# Patient Record
Sex: MALE | Race: WHITE | NOT HISPANIC OR LATINO | URBAN - METROPOLITAN AREA
[De-identification: names, ages, dates, MRNs, and addresses within clinical notes are randomized per-mention and may not be internally consistent; named-entity substitution may affect disease eponyms.]

---

## 2018-06-22 ENCOUNTER — EMERGENCY (EMERGENCY)
Facility: HOSPITAL | Age: 63
LOS: 1 days | Discharge: HOME | End: 2018-06-22
Admitting: EMERGENCY MEDICINE
Payer: MEDICAID

## 2018-06-22 ENCOUNTER — EMERGENCY (EMERGENCY)
Facility: HOSPITAL | Age: 63
LOS: 1 days | Discharge: LEFT BEFORE DISCHARGE | End: 2018-06-22
Admitting: EMERGENCY MEDICINE

## 2018-06-22 VITALS
RESPIRATION RATE: 17 BRPM | DIASTOLIC BLOOD PRESSURE: 99 MMHG | OXYGEN SATURATION: 98 % | TEMPERATURE: 98 F | HEART RATE: 95 BPM | SYSTOLIC BLOOD PRESSURE: 158 MMHG

## 2018-06-22 VITALS
TEMPERATURE: 98 F | SYSTOLIC BLOOD PRESSURE: 138 MMHG | HEART RATE: 94 BPM | RESPIRATION RATE: 16 BRPM | DIASTOLIC BLOOD PRESSURE: 89 MMHG | OXYGEN SATURATION: 98 %

## 2018-06-22 DIAGNOSIS — T74.11XA ADULT PHYSICAL ABUSE, CONFIRMED, INITIAL ENCOUNTER: ICD-10-CM
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
## 2018-06-22 DIAGNOSIS — R51 HEADACHE: ICD-10-CM
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
## 2018-06-22 DIAGNOSIS — Z04.1 ENCOUNTER FOR EXAMINATION AND OBSERVATION FOLLOWING TRANSPORT ACCIDENT: ICD-10-CM

## 2018-06-22 DIAGNOSIS — M25.511 PAIN IN RIGHT SHOULDER: ICD-10-CM
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
## 2018-06-22 PROCEDURE — 73030 X-RAY EXAM OF SHOULDER: CPT | Mod: 26,RT

## 2018-06-22 PROCEDURE — 99284 EMERGENCY DEPT VISIT MOD MDM: CPT | Mod: 25

## 2018-06-22 PROCEDURE — 70450 CT HEAD/BRAIN W/O DYE: CPT | Mod: 26

## 2018-06-22 RX ORDER — ACETAMINOPHEN 500 MG
975 TABLET ORAL ONCE
Qty: 0 | Refills: 0 | Status: COMPLETED | OUTPATIENT
Start: 2018-06-22 | End: 2018-06-22

## 2018-06-22 RX ADMIN — Medication 975 MILLIGRAM(S): at 23:43
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
## 2018-06-22 NOTE — ED PROVIDER NOTE - MEDICAL DECISION MAKING DETAILS
CT Head and right shoulder xray negative. Pt given tylenol in ED. A&Ox3. NAD. Ambulating well and without difficulty. Supportive care instructions, F/U with PMD this week. Strict return precautions reviewed with pt in which pt verbalizes understanding and agrees to.

## 2022-04-19 PROBLEM — Z00.00 ENCOUNTER FOR PREVENTIVE HEALTH EXAMINATION: Status: ACTIVE | Noted: 2022-04-19

## 2022-04-22 ENCOUNTER — APPOINTMENT (OUTPATIENT)
Dept: UROLOGY | Facility: CLINIC | Age: 67
End: 2022-04-22
Payer: MEDICAID

## 2022-04-22 VITALS
BODY MASS INDEX: 27.28 KG/M2 | RESPIRATION RATE: 17 BRPM | SYSTOLIC BLOOD PRESSURE: 149 MMHG | HEIGHT: 68 IN | DIASTOLIC BLOOD PRESSURE: 80 MMHG | HEART RATE: 72 BPM | WEIGHT: 180 LBS | TEMPERATURE: 97.3 F

## 2022-04-22 DIAGNOSIS — R39.9 UNSPECIFIED SYMPTOMS AND SIGNS INVOLVING THE GENITOURINARY SYSTEM: ICD-10-CM

## 2022-04-22 DIAGNOSIS — Z12.5 ENCOUNTER FOR SCREENING FOR MALIGNANT NEOPLASM OF PROSTATE: ICD-10-CM

## 2022-04-22 PROCEDURE — 99204 OFFICE O/P NEW MOD 45 MIN: CPT

## 2022-04-22 NOTE — REVIEW OF SYSTEMS
[Eyesight Problems] : eyesight problems [Dry Eyes] : dryness of the eyes [see HPI] : see HPI [Seen by urologist before (Name)  ___] : Preciously seen by a urologist: [unfilled] [Wake up at night to urinate  How many times?  ___] : wakes up to urinate [unfilled] times during the night [Strain or push to urinate] : strain or push to urinate [Wait a long time to urinate] : waits a long time to urinate [Slow urine stream] : slow urine stream [Interrupted urine stream] : interrupted urine stream [Bladder fullness after urinating] : bladder fullness after urinating [Negative] : Heme/Lymph

## 2022-04-22 NOTE — ASSESSMENT
[FreeTextEntry1] : 66 year old with OAB. PVR minimal. UCx sent. Given failed 2 outlet procedures and anticholinergic medications, will trial mirabegron. If no impact will perform UDS.\par \par PSA today. Will repeat MRI given PIRADS 4 lesion on MRI and biopsy was not fusion/targeted.\par \par    I spent 45 -minutes time today on all issues related to this encounter on today date of service including non face to face time.\par

## 2022-04-22 NOTE — PHYSICAL EXAM
[General Appearance - Well Developed] : well developed [General Appearance - Well Nourished] : well nourished [Normal Appearance] : normal appearance [Well Groomed] : well groomed [General Appearance - In No Acute Distress] : no acute distress [Edema] : no peripheral edema [] : no respiratory distress [Respiration, Rhythm And Depth] : normal respiratory rhythm and effort [Exaggerated Use Of Accessory Muscles For Inspiration] : no accessory muscle use [No Prostate Nodules] : no prostate nodules [FreeTextEntry1] : no SARIAH

## 2022-04-22 NOTE — HISTORY OF PRESENT ILLNESS
[FreeTextEntry1] : 66 year old with BPH.\par \par Patient had REZUM ~2018. Helped initially, but then experienced nocturia, frequency, intermittent stream, straining, feeling of incomplete bladder emptying.\par \par Cystoscopy 5/21: moderately enlarged prostate with bilobar hyperplasia. \par \par PSA 3 and MRI at R 6/2021: 44cc pirads 4 L mid PZpl and PIRADS 3 L mid-apex PZ\par \par TR prostate bx 6/9/21, negative, however, based on outside records does not seem to have been fusion biopsy.\par \par TURP 6/29/21, chips benign. Did not improve symptoms. Patient also reports that he feels like constant sweating/wetness since the surgery but did not have this prior. Tried Ditropan and Detrol in the past, but does not believe they has an impact. Does not believe he has had UDS in the past.\par \par Currently experiencing nocturia 4-5x, frequency, intermittent stream, straining, feeling of incomplete bladder emptying. No hematuria, nephrolithiasis. Father passed of bladder cancer at 74. Not on any medication. Patient does report spine issues.\par \par IPSS 26, unhappy\par \par PVR today 0\par \par Patient has taken Cialis in the past\par \par \par PSA \par 5/11/21: 3.0 \par 5/2019: 2.1\par \par T 386 5/2019

## 2022-04-24 LAB — PSA SERPL-MCNC: 3.07 NG/ML

## 2022-04-26 RX ORDER — MIRABEGRON 25 MG/1
25 TABLET, FILM COATED, EXTENDED RELEASE ORAL
Qty: 90 | Refills: 3 | Status: DISCONTINUED | COMMUNITY
Start: 2022-04-22 | End: 2022-04-26

## 2022-05-05 ENCOUNTER — APPOINTMENT (OUTPATIENT)
Dept: MRI IMAGING | Facility: CLINIC | Age: 67
End: 2022-05-05
Payer: MEDICAID

## 2022-05-05 ENCOUNTER — OUTPATIENT (OUTPATIENT)
Dept: OUTPATIENT SERVICES | Facility: HOSPITAL | Age: 67
LOS: 1 days | End: 2022-05-05

## 2022-05-05 ENCOUNTER — RESULT REVIEW (OUTPATIENT)
Age: 67
End: 2022-05-05

## 2022-05-05 DIAGNOSIS — R97.20 ELEVATED PROSTATE SPECIFIC ANTIGEN [PSA]: ICD-10-CM

## 2022-05-05 PROCEDURE — 72197 MRI PELVIS W/O & W/DYE: CPT | Mod: 26

## 2022-05-06 ENCOUNTER — NON-APPOINTMENT (OUTPATIENT)
Age: 67
End: 2022-05-06

## 2022-05-06 NOTE — HISTORY OF PRESENT ILLNESS
[FreeTextEntry1] : Discussed MRI results - PIRADS 3 and 4 lesion. patient's previous biopsy was not targeted. Discussed need for repeat biopsy TP with fusion. Reached out to Dr. Brown's team to schedule. They will contact patient. Patient in agreement with plan.

## 2022-05-10 ENCOUNTER — NON-APPOINTMENT (OUTPATIENT)
Age: 67
End: 2022-05-10

## 2022-05-10 DIAGNOSIS — Z01.818 ENCOUNTER FOR OTHER PREPROCEDURAL EXAMINATION: ICD-10-CM

## 2022-05-18 ENCOUNTER — APPOINTMENT (OUTPATIENT)
Dept: UROLOGY | Facility: CLINIC | Age: 67
End: 2022-05-18
Payer: MEDICAID

## 2022-05-18 ENCOUNTER — OUTPATIENT (OUTPATIENT)
Dept: OUTPATIENT SERVICES | Facility: HOSPITAL | Age: 67
LOS: 1 days | End: 2022-05-18
Payer: SELF-PAY

## 2022-05-18 VITALS
RESPIRATION RATE: 17 BRPM | BODY MASS INDEX: 27.28 KG/M2 | HEART RATE: 80 BPM | OXYGEN SATURATION: 99 % | SYSTOLIC BLOOD PRESSURE: 138 MMHG | DIASTOLIC BLOOD PRESSURE: 82 MMHG | HEIGHT: 68 IN | TEMPERATURE: 97.6 F | WEIGHT: 180 LBS

## 2022-05-18 DIAGNOSIS — R97.20 ELEVATED PROSTATE, SPECIFIC ANTIGEN [PSA]: ICD-10-CM

## 2022-05-18 DIAGNOSIS — R35.0 FREQUENCY OF MICTURITION: ICD-10-CM

## 2022-05-18 PROCEDURE — 76872 US TRANSRECTAL: CPT | Mod: 26

## 2022-05-18 PROCEDURE — 76942 ECHO GUIDE FOR BIOPSY: CPT | Mod: 26,59

## 2022-05-18 PROCEDURE — 55700: CPT

## 2022-05-18 PROCEDURE — 76942 ECHO GUIDE FOR BIOPSY: CPT | Mod: 59

## 2022-05-18 PROCEDURE — 76872 US TRANSRECTAL: CPT

## 2022-05-20 ENCOUNTER — TRANSCRIPTION ENCOUNTER (OUTPATIENT)
Age: 67
End: 2022-05-20

## 2022-05-23 LAB — CORE LAB BIOPSY: NORMAL

## 2022-05-25 DIAGNOSIS — R97.20 ELEVATED PROSTATE SPECIFIC ANTIGEN [PSA]: ICD-10-CM

## 2022-05-27 RX ORDER — DIAZEPAM 5 MG/1
5 TABLET ORAL
Qty: 1 | Refills: 0 | Status: DISCONTINUED | COMMUNITY
Start: 2022-05-10 | End: 2022-05-27

## 2022-05-27 RX ORDER — HYDROCODONE BITARTRATE AND ACETAMINOPHEN 5; 325 MG/1; MG/1
5-325 TABLET ORAL
Qty: 1 | Refills: 0 | Status: DISCONTINUED | COMMUNITY
Start: 2022-05-10 | End: 2022-05-27

## 2022-06-06 ENCOUNTER — APPOINTMENT (OUTPATIENT)
Dept: UROLOGY | Facility: CLINIC | Age: 67
End: 2022-06-06
Payer: MEDICARE

## 2022-06-06 VITALS
SYSTOLIC BLOOD PRESSURE: 150 MMHG | RESPIRATION RATE: 17 BRPM | HEIGHT: 68 IN | WEIGHT: 175 LBS | BODY MASS INDEX: 26.52 KG/M2 | HEART RATE: 93 BPM | DIASTOLIC BLOOD PRESSURE: 92 MMHG

## 2022-06-06 PROCEDURE — 99215 OFFICE O/P EST HI 40 MIN: CPT

## 2022-06-09 ENCOUNTER — OUTPATIENT (OUTPATIENT)
Dept: OUTPATIENT SERVICES | Facility: HOSPITAL | Age: 67
LOS: 1 days | End: 2022-06-09
Payer: MEDICARE

## 2022-06-09 VITALS
WEIGHT: 179.02 LBS | HEART RATE: 73 BPM | DIASTOLIC BLOOD PRESSURE: 85 MMHG | TEMPERATURE: 97 F | HEIGHT: 68 IN | RESPIRATION RATE: 16 BRPM | OXYGEN SATURATION: 99 % | SYSTOLIC BLOOD PRESSURE: 127 MMHG

## 2022-06-09 DIAGNOSIS — C61 MALIGNANT NEOPLASM OF PROSTATE: ICD-10-CM

## 2022-06-09 DIAGNOSIS — I10 ESSENTIAL (PRIMARY) HYPERTENSION: ICD-10-CM

## 2022-06-09 DIAGNOSIS — Z90.79 ACQUIRED ABSENCE OF OTHER GENITAL ORGAN(S): Chronic | ICD-10-CM

## 2022-06-09 DIAGNOSIS — Z98.890 OTHER SPECIFIED POSTPROCEDURAL STATES: Chronic | ICD-10-CM

## 2022-06-09 LAB
ANION GAP SERPL CALC-SCNC: 12 MMOL/L — SIGNIFICANT CHANGE UP (ref 7–14)
BUN SERPL-MCNC: 13 MG/DL — SIGNIFICANT CHANGE UP (ref 7–23)
CALCIUM SERPL-MCNC: 9.9 MG/DL — SIGNIFICANT CHANGE UP (ref 8.4–10.5)
CHLORIDE SERPL-SCNC: 103 MMOL/L — SIGNIFICANT CHANGE UP (ref 98–107)
CO2 SERPL-SCNC: 24 MMOL/L — SIGNIFICANT CHANGE UP (ref 22–31)
CREAT SERPL-MCNC: 0.91 MG/DL — SIGNIFICANT CHANGE UP (ref 0.5–1.3)
EGFR: 94 ML/MIN/1.73M2 — SIGNIFICANT CHANGE UP
GLUCOSE SERPL-MCNC: 92 MG/DL — SIGNIFICANT CHANGE UP (ref 70–99)
HCT VFR BLD CALC: 41.7 % — SIGNIFICANT CHANGE UP (ref 39–50)
HGB BLD-MCNC: 13.3 G/DL — SIGNIFICANT CHANGE UP (ref 13–17)
MCHC RBC-ENTMCNC: 23.1 PG — LOW (ref 27–34)
MCHC RBC-ENTMCNC: 31.9 GM/DL — LOW (ref 32–36)
MCV RBC AUTO: 72.5 FL — LOW (ref 80–100)
NRBC # BLD: 0 /100 WBCS — SIGNIFICANT CHANGE UP
NRBC # FLD: 0 K/UL — SIGNIFICANT CHANGE UP
PLATELET # BLD AUTO: 396 K/UL — SIGNIFICANT CHANGE UP (ref 150–400)
POTASSIUM SERPL-MCNC: 3.9 MMOL/L — SIGNIFICANT CHANGE UP (ref 3.5–5.3)
POTASSIUM SERPL-SCNC: 3.9 MMOL/L — SIGNIFICANT CHANGE UP (ref 3.5–5.3)
RBC # BLD: 5.75 M/UL — SIGNIFICANT CHANGE UP (ref 4.2–5.8)
RBC # FLD: 16.3 % — HIGH (ref 10.3–14.5)
SODIUM SERPL-SCNC: 139 MMOL/L — SIGNIFICANT CHANGE UP (ref 135–145)
WBC # BLD: 6.63 K/UL — SIGNIFICANT CHANGE UP (ref 3.8–10.5)
WBC # FLD AUTO: 6.63 K/UL — SIGNIFICANT CHANGE UP (ref 3.8–10.5)

## 2022-06-09 PROCEDURE — 93010 ELECTROCARDIOGRAM REPORT: CPT

## 2022-06-09 RX ORDER — VALSARTAN 80 MG/1
1 TABLET ORAL
Qty: 0 | Refills: 0 | DISCHARGE

## 2022-06-09 NOTE — H&P PST ADULT - NSICDXPASTSURGICALHX_GEN_ALL_CORE_FT
PAST SURGICAL HISTORY:  History of prostate surgery 2018 "RESUME procedure"    S/P TURP (transurethral resection of prostate) 2021

## 2022-06-09 NOTE — H&P PST ADULT - HISTORY OF PRESENT ILLNESS
67 yo male with preop dx. malignant neoplasm of prostate presents to pre surgical testing.  Pt with elevated PSA s/p prostate biopsy 5/2022.  Pt is scheduled for transperineal fusion guided focal cryoablation of the prostate.

## 2022-06-09 NOTE — H&P PST ADULT - PROBLEM SELECTOR PLAN 1
Pt is scheduled for transperineal fusion guided focal cryoablation of the prostate on 6/17/22.  Verbal and written pre op instructions reviewed with patient and pt able to verbalize understanding.  Pt instructed to follow surgeon's guidelines regarding COVID testing preop.

## 2022-06-09 NOTE — H&P PST ADULT - NEGATIVE RESPIRATORY AND THORAX SYMPTOMS
PATIENT ONLY HAS ONE PILL LEFT  WAITING FOR MEDS TO COME IN THE MAIL  WILL SEND SOME TO LOCAL PHARM 
Refill being sent on a different encounter since it was not done over the weekend and patient called again this morning 
no wheezing/no dyspnea/no cough

## 2022-06-09 NOTE — H&P PST ADULT - NSICDXPASTMEDICALHX_GEN_ALL_CORE_FT
PAST MEDICAL HISTORY:  Anxiety     GERD (gastroesophageal reflux disease)     HLD (hyperlipidemia)     HTN (hypertension)     Malignant neoplasm of prostate

## 2022-06-09 NOTE — H&P PST ADULT - NSICDXFAMILYHX_GEN_ALL_CORE_FT
FAMILY HISTORY:  Father  Still living? Unknown  FH: cancer, Age at diagnosis: Age Unknown    Mother  Still living? No  FH: cancer, Age at diagnosis: Age Unknown

## 2022-06-09 NOTE — H&P PST ADULT - SKIN/BREAST
[Menarche Age ____] : age at menarche was [unfilled] [Menopause Age____] : age at menopause was [unfilled] [Total Preg ___] : G[unfilled] [FreeTextEntry5] : JEANNETTE [FreeTextEntry7] : YES   details…

## 2022-06-15 LAB — SARS-COV-2 RNA SPEC QL NAA+PROBE: SIGNIFICANT CHANGE UP

## 2022-06-16 ENCOUNTER — TRANSCRIPTION ENCOUNTER (OUTPATIENT)
Age: 67
End: 2022-06-16

## 2022-06-16 VITALS
TEMPERATURE: 98 F | RESPIRATION RATE: 16 BRPM | OXYGEN SATURATION: 99 % | SYSTOLIC BLOOD PRESSURE: 117 MMHG | HEART RATE: 73 BPM | HEIGHT: 68 IN | WEIGHT: 179.02 LBS | DIASTOLIC BLOOD PRESSURE: 79 MMHG

## 2022-06-17 ENCOUNTER — APPOINTMENT (OUTPATIENT)
Dept: UROLOGY | Facility: AMBULATORY SURGERY CENTER | Age: 67
End: 2022-06-17

## 2022-06-17 ENCOUNTER — TRANSCRIPTION ENCOUNTER (OUTPATIENT)
Age: 67
End: 2022-06-17

## 2022-06-17 ENCOUNTER — OUTPATIENT (OUTPATIENT)
Dept: OUTPATIENT SERVICES | Facility: HOSPITAL | Age: 67
LOS: 1 days | Discharge: HOME | End: 2022-06-17
Payer: MEDICARE

## 2022-06-17 ENCOUNTER — NON-APPOINTMENT (OUTPATIENT)
Age: 67
End: 2022-06-17

## 2022-06-17 VITALS
RESPIRATION RATE: 23 BRPM | HEART RATE: 82 BPM | DIASTOLIC BLOOD PRESSURE: 69 MMHG | TEMPERATURE: 98 F | OXYGEN SATURATION: 99 % | SYSTOLIC BLOOD PRESSURE: 107 MMHG

## 2022-06-17 DIAGNOSIS — Z90.79 ACQUIRED ABSENCE OF OTHER GENITAL ORGAN(S): Chronic | ICD-10-CM

## 2022-06-17 DIAGNOSIS — C61 MALIGNANT NEOPLASM OF PROSTATE: ICD-10-CM

## 2022-06-17 DIAGNOSIS — Z98.890 OTHER SPECIFIED POSTPROCEDURAL STATES: Chronic | ICD-10-CM

## 2022-06-17 PROCEDURE — 76942 ECHO GUIDE FOR BIOPSY: CPT | Mod: 26,59

## 2022-06-17 PROCEDURE — 76377 3D RENDER W/INTRP POSTPROCES: CPT | Mod: 26

## 2022-06-17 PROCEDURE — 55873 CRYOABLATE PROSTATE: CPT

## 2022-06-17 PROCEDURE — 76872 US TRANSRECTAL: CPT | Mod: 26,59

## 2022-06-17 DEVICE — GUIDEWIRE AMPLATZ SUPER-STIFF STRAIGHT .038" X 260CM: Type: IMPLANTABLE DEVICE | Status: FUNCTIONAL

## 2022-06-17 DEVICE — KIT PROSTATE CRYOABLATION PROCEDURE V-PROBE CRYOPROBE: Type: IMPLANTABLE DEVICE | Status: FUNCTIONAL

## 2022-06-17 RX ORDER — CITALOPRAM 10 MG/1
1 TABLET, FILM COATED ORAL
Qty: 0 | Refills: 0 | DISCHARGE

## 2022-06-17 RX ORDER — VALSARTAN 80 MG/1
1 TABLET ORAL
Qty: 0 | Refills: 0 | DISCHARGE

## 2022-06-17 RX ORDER — ASPIRIN/CALCIUM CARB/MAGNESIUM 324 MG
1 TABLET ORAL
Qty: 0 | Refills: 0 | DISCHARGE

## 2022-06-17 RX ORDER — ATORVASTATIN CALCIUM 80 MG/1
1 TABLET, FILM COATED ORAL
Qty: 0 | Refills: 0 | DISCHARGE

## 2022-06-17 RX ORDER — FENOFIBRATE,MICRONIZED 130 MG
1 CAPSULE ORAL
Qty: 0 | Refills: 0 | DISCHARGE

## 2022-06-17 RX ORDER — OMEPRAZOLE 10 MG/1
1 CAPSULE, DELAYED RELEASE ORAL
Qty: 0 | Refills: 0 | DISCHARGE

## 2022-06-17 NOTE — ASU DISCHARGE PLAN (ADULT/PEDIATRIC) - NURSING INSTRUCTIONS
Progress to regular diet as tolerated.  Keep well hydrated.   Next dose of Tylenol may be taken at 6pm

## 2022-06-17 NOTE — ASU DISCHARGE PLAN (ADULT/PEDIATRIC) - NS MD DC FALL RISK RISK
For information on Fall & Injury Prevention, visit: [email or web address] OR  [email or web address] OR  https://www.cdc.gov/steadi/patient.html

## 2022-06-18 ENCOUNTER — NON-APPOINTMENT (OUTPATIENT)
Age: 67
End: 2022-06-18

## 2022-06-20 PROBLEM — E78.5 HYPERLIPIDEMIA, UNSPECIFIED: Chronic | Status: ACTIVE | Noted: 2022-06-09

## 2022-06-20 PROBLEM — F41.9 ANXIETY DISORDER, UNSPECIFIED: Chronic | Status: ACTIVE | Noted: 2022-06-09

## 2022-06-20 PROBLEM — K21.9 GASTRO-ESOPHAGEAL REFLUX DISEASE WITHOUT ESOPHAGITIS: Chronic | Status: ACTIVE | Noted: 2022-06-09

## 2022-06-20 PROBLEM — I10 ESSENTIAL (PRIMARY) HYPERTENSION: Chronic | Status: ACTIVE | Noted: 2022-06-09

## 2022-06-20 PROBLEM — C61 MALIGNANT NEOPLASM OF PROSTATE: Chronic | Status: ACTIVE | Noted: 2022-06-09

## 2022-06-28 ENCOUNTER — APPOINTMENT (OUTPATIENT)
Dept: UROLOGY | Facility: CLINIC | Age: 67
End: 2022-06-28

## 2022-06-28 VITALS
SYSTOLIC BLOOD PRESSURE: 119 MMHG | HEIGHT: 68 IN | HEART RATE: 72 BPM | BODY MASS INDEX: 26.52 KG/M2 | WEIGHT: 175 LBS | DIASTOLIC BLOOD PRESSURE: 74 MMHG | TEMPERATURE: 98.2 F | RESPIRATION RATE: 17 BRPM

## 2022-07-14 ENCOUNTER — APPOINTMENT (OUTPATIENT)
Dept: UROLOGY | Facility: CLINIC | Age: 67
End: 2022-07-14

## 2022-07-14 VITALS
SYSTOLIC BLOOD PRESSURE: 126 MMHG | TEMPERATURE: 98 F | BODY MASS INDEX: 27.13 KG/M2 | HEIGHT: 68 IN | DIASTOLIC BLOOD PRESSURE: 81 MMHG | WEIGHT: 179 LBS | HEART RATE: 66 BPM

## 2022-07-14 PROCEDURE — 99024 POSTOP FOLLOW-UP VISIT: CPT

## 2022-07-15 LAB
APPEARANCE: CLEAR
BACTERIA: NEGATIVE
BILIRUBIN URINE: NEGATIVE
BLOOD URINE: NEGATIVE
COLOR: YELLOW
GLUCOSE QUALITATIVE U: NEGATIVE
HYALINE CASTS: 0 /LPF
KETONES URINE: NEGATIVE
LEUKOCYTE ESTERASE URINE: NEGATIVE
MICROSCOPIC-UA: NORMAL
NITRITE URINE: NEGATIVE
PH URINE: 5.5
PROTEIN URINE: NORMAL
PSA FREE FLD-MCNC: 8 %
PSA FREE SERPL-MCNC: 0.15 NG/ML
PSA SERPL-MCNC: 1.85 NG/ML
RED BLOOD CELLS URINE: 7 /HPF
SPECIFIC GRAVITY URINE: 1.02
SQUAMOUS EPITHELIAL CELLS: 1 /HPF
UROBILINOGEN URINE: NORMAL
WHITE BLOOD CELLS URINE: 2 /HPF

## 2022-07-15 RX ORDER — OXYBUTYNIN CHLORIDE 5 MG/1
5 TABLET ORAL TWICE DAILY
Qty: 60 | Refills: 3 | Status: DISCONTINUED | COMMUNITY
Start: 2022-04-26 | End: 2022-07-15

## 2022-07-15 RX ORDER — TROSPIUM CHLORIDE 20 MG/1
20 TABLET, FILM COATED ORAL
Qty: 90 | Refills: 3 | Status: DISCONTINUED | COMMUNITY
Start: 2022-05-23 | End: 2022-07-15

## 2022-07-15 RX ORDER — TROSPIUM CHLORIDE 20 MG/1
20 TABLET, FILM COATED ORAL
Qty: 90 | Refills: 3 | Status: DISCONTINUED | COMMUNITY
Start: 2022-05-19 | End: 2022-07-15

## 2022-07-17 LAB — BACTERIA UR CULT: NORMAL

## 2022-07-22 ENCOUNTER — NON-APPOINTMENT (OUTPATIENT)
Age: 67
End: 2022-07-22

## 2023-05-01 ENCOUNTER — APPOINTMENT (OUTPATIENT)
Dept: UROLOGY | Facility: CLINIC | Age: 68
End: 2023-05-01
Payer: MEDICARE

## 2023-05-01 DIAGNOSIS — R35.0 FREQUENCY OF MICTURITION: ICD-10-CM

## 2023-05-01 DIAGNOSIS — R68.82 DECREASED LIBIDO: ICD-10-CM

## 2023-05-01 PROCEDURE — 99215 OFFICE O/P EST HI 40 MIN: CPT | Mod: 25

## 2023-05-01 PROCEDURE — 51798 US URINE CAPACITY MEASURE: CPT

## 2023-05-01 RX ORDER — VALSARTAN 160 MG/1
160 TABLET, COATED ORAL
Refills: 0 | Status: ACTIVE | COMMUNITY

## 2023-05-01 RX ORDER — ATORVASTATIN CALCIUM 80 MG/1
TABLET, FILM COATED ORAL
Refills: 0 | Status: ACTIVE | COMMUNITY

## 2023-05-01 RX ORDER — ASPIRIN 81 MG/1
81 TABLET, COATED ORAL
Qty: 90 | Refills: 0 | Status: ACTIVE | COMMUNITY
Start: 2022-12-12

## 2023-05-01 RX ORDER — OMEPRAZOLE 20 MG/1
20 CAPSULE, DELAYED RELEASE ORAL
Refills: 0 | Status: ACTIVE | COMMUNITY

## 2023-05-02 LAB
APPEARANCE: CLEAR
BACTERIA UR CULT: NORMAL
BACTERIA: NEGATIVE /HPF
BILIRUBIN URINE: NEGATIVE
BLOOD URINE: NEGATIVE
CAST: 0 /LPF
COLOR: NORMAL
EPITHELIAL CELLS: 1 /HPF
GLUCOSE QUALITATIVE U: NEGATIVE MG/DL
KETONES URINE: NEGATIVE MG/DL
LEUKOCYTE ESTERASE URINE: NEGATIVE
MICROSCOPIC-UA: NORMAL
NITRITE URINE: NEGATIVE
PH URINE: 6
PROTEIN URINE: NEGATIVE MG/DL
PSA FREE FLD-MCNC: 15 %
PSA FREE SERPL-MCNC: 0.19 NG/ML
PSA SERPL-MCNC: 1.23 NG/ML
RED BLOOD CELLS URINE: 1 /HPF
SPECIFIC GRAVITY URINE: 1.02
TESTOST SERPL-MCNC: 427 NG/DL
UROBILINOGEN URINE: 0.2 MG/DL
WHITE BLOOD CELLS URINE: 1 /HPF

## 2023-05-02 NOTE — LETTER GREETING
[Dear  ___] : Dear  [unfilled], [Consult Letter:] : Your patient, [unfilled] was seen in my office today for consultation. [Please see my note below.] : Please see my note below. [FreeTextEntry2] : Kevyn Benítez MD\par [address]\par [place] [address]

## 2023-05-02 NOTE — DISEASE MANAGEMENT
[1] : T1 [c] : c [0] : N0 [X] : MX [0-10] : 0 -10 ng/mL [Biopsy with Fusion] : Patient had a biopsy with fusion on [Biopsy results sent to PCP/Referring Physician] : Biopsy results sent to PCP/Referring Physician [7(3+4)] : Fusion Biopsy [place] Score: 7(3+4) [] : Patient had a Prostate MRI [4] : 4 [IIB] : IIB [Focal Ablation] : Focal Ablation [BiopsyDate] : 05/22 [MeasuredProstateVolume] : 32 [TotalCores] : 20 [TotalPositiveCores] : 4 [MaxCoreInvolvement] : 95

## 2023-05-02 NOTE — LETTER CLOSING
[FreeTextEntry3] : Sincerely,\par \par \par \par \par Gaston Ghosh MD, FACS\par Chief of Urology, [facility]\par  of Urology\par Director of Robotic and Laparoscopic Surgery\par [facility]\par \par [facility]\par [address] [place] [address]\[place] [address]\par P: [phone]\par F: [phone]\par [facility]

## 2023-05-02 NOTE — HISTORY OF PRESENT ILLNESS
[FreeTextEntry1] : Lucas Bearden returns to the office.  He is 67 years old and has been treated with focal cryoablation of the prostate for [place] 3+4 adenocarcinoma of the prostate.  His  Procedure was performed in June 2022.  His PSA was checked in April 2023 and was 1.4ng/mL (shown on his phone dayton) which is up from 1.05ng/mL in December 2022. His preoperative PSA level was 3.07 ng/mL.  He now presents for additional follow-up.\par \par Current urinary symptoms: nocturia x4-5, urinary frequency (q1-2hours), weak stream, leakage (changes pad 2-3x a day). Denies urinary urgency, hematuria, dysuria, incomplete emptying.  This constellation of symptoms was present even prior to his cryoablation treatment.  He had previously undergone a transurethral resection of the prostate and Rezum treatment at an outside institution.  He has expressed regret about having undergone these procedures in the past as his symptoms seem to get worse after their performance.  He has been on tamsulosin in the past and developed significant headaches.  \par \par Sexual function: weak erections, not currently on any treatment at this time but also not interested in pursuing any treatment.  He did express some concerns about having a very low or nonexistent libido.\par

## 2023-05-15 ENCOUNTER — APPOINTMENT (OUTPATIENT)
Dept: MRI IMAGING | Facility: CLINIC | Age: 68
End: 2023-05-15
Payer: MEDICARE

## 2023-05-15 ENCOUNTER — OUTPATIENT (OUTPATIENT)
Dept: OUTPATIENT SERVICES | Facility: HOSPITAL | Age: 68
LOS: 1 days | End: 2023-05-15

## 2023-05-15 DIAGNOSIS — Z90.79 ACQUIRED ABSENCE OF OTHER GENITAL ORGAN(S): Chronic | ICD-10-CM

## 2023-05-15 DIAGNOSIS — Z98.890 OTHER SPECIFIED POSTPROCEDURAL STATES: Chronic | ICD-10-CM

## 2023-05-15 PROCEDURE — 72197 MRI PELVIS W/O & W/DYE: CPT | Mod: 26

## 2023-05-23 ENCOUNTER — OUTPATIENT (OUTPATIENT)
Dept: OUTPATIENT SERVICES | Facility: HOSPITAL | Age: 68
LOS: 1 days | End: 2023-05-23

## 2023-05-23 DIAGNOSIS — Z98.890 OTHER SPECIFIED POSTPROCEDURAL STATES: Chronic | ICD-10-CM

## 2023-05-23 DIAGNOSIS — Z90.79 ACQUIRED ABSENCE OF OTHER GENITAL ORGAN(S): Chronic | ICD-10-CM

## 2023-06-02 ENCOUNTER — NON-APPOINTMENT (OUTPATIENT)
Age: 68
End: 2023-06-02

## 2023-06-08 ENCOUNTER — OUTPATIENT (OUTPATIENT)
Dept: OUTPATIENT SERVICES | Facility: HOSPITAL | Age: 68
LOS: 1 days | End: 2023-06-08
Payer: MEDICARE

## 2023-06-08 ENCOUNTER — APPOINTMENT (OUTPATIENT)
Dept: UROLOGY | Facility: CLINIC | Age: 68
End: 2023-06-08
Payer: MEDICARE

## 2023-06-08 VITALS
WEIGHT: 179 LBS | SYSTOLIC BLOOD PRESSURE: 127 MMHG | DIASTOLIC BLOOD PRESSURE: 83 MMHG | RESPIRATION RATE: 17 BRPM | HEART RATE: 71 BPM | HEIGHT: 68 IN | BODY MASS INDEX: 27.13 KG/M2

## 2023-06-08 VITALS
HEART RATE: 79 BPM | WEIGHT: 179 LBS | RESPIRATION RATE: 17 BRPM | HEIGHT: 68 IN | BODY MASS INDEX: 27.13 KG/M2 | SYSTOLIC BLOOD PRESSURE: 118 MMHG | DIASTOLIC BLOOD PRESSURE: 75 MMHG

## 2023-06-08 DIAGNOSIS — R35.0 FREQUENCY OF MICTURITION: ICD-10-CM

## 2023-06-08 DIAGNOSIS — Z98.890 OTHER SPECIFIED POSTPROCEDURAL STATES: Chronic | ICD-10-CM

## 2023-06-08 DIAGNOSIS — Z90.79 ACQUIRED ABSENCE OF OTHER GENITAL ORGAN(S): Chronic | ICD-10-CM

## 2023-06-08 PROCEDURE — 76942 ECHO GUIDE FOR BIOPSY: CPT | Mod: 59

## 2023-06-08 PROCEDURE — 55700: CPT | Mod: 22

## 2023-06-08 PROCEDURE — 76377 3D RENDER W/INTRP POSTPROCES: CPT | Mod: 26

## 2023-06-08 PROCEDURE — 76942 ECHO GUIDE FOR BIOPSY: CPT | Mod: 26,59

## 2023-06-08 PROCEDURE — 55700: CPT

## 2023-06-12 DIAGNOSIS — C61 MALIGNANT NEOPLASM OF PROSTATE: ICD-10-CM

## 2023-06-16 ENCOUNTER — NON-APPOINTMENT (OUTPATIENT)
Age: 68
End: 2023-06-16

## 2023-06-16 LAB — PROSTATE BIOPSY: NORMAL

## 2023-06-22 ENCOUNTER — APPOINTMENT (OUTPATIENT)
Dept: UROLOGY | Facility: CLINIC | Age: 68
End: 2023-06-22

## 2023-07-05 RX ORDER — OXYBUTYNIN CHLORIDE 10 MG/1
10 TABLET, EXTENDED RELEASE ORAL DAILY
Qty: 90 | Refills: 3 | Status: DISCONTINUED | COMMUNITY
Start: 2023-05-01 | End: 2023-07-05

## 2023-07-05 RX ORDER — ALFUZOSIN HYDROCHLORIDE 10 MG/1
10 TABLET, EXTENDED RELEASE ORAL
Qty: 90 | Refills: 3 | Status: ACTIVE | COMMUNITY
Start: 2022-07-14 | End: 1900-01-01

## 2023-11-06 ENCOUNTER — APPOINTMENT (OUTPATIENT)
Dept: UROLOGY | Facility: CLINIC | Age: 68
End: 2023-11-06

## 2024-07-02 ENCOUNTER — APPOINTMENT (OUTPATIENT)
Dept: UROLOGY | Facility: CLINIC | Age: 69
End: 2024-07-02
Payer: MEDICARE

## 2024-07-02 DIAGNOSIS — R35.0 FREQUENCY OF MICTURITION: ICD-10-CM

## 2024-07-02 DIAGNOSIS — C61 MALIGNANT NEOPLASM OF PROSTATE: ICD-10-CM

## 2024-07-02 DIAGNOSIS — N52.9 MALE ERECTILE DYSFUNCTION, UNSPECIFIED: ICD-10-CM

## 2024-07-02 LAB
PSA FREE FLD-MCNC: 16 %
PSA FREE SERPL-MCNC: 0.23 NG/ML
PSA SERPL-MCNC: 1.38 NG/ML

## 2024-07-02 PROCEDURE — 99214 OFFICE O/P EST MOD 30 MIN: CPT

## 2024-07-02 PROCEDURE — G2211 COMPLEX E/M VISIT ADD ON: CPT

## 2024-07-02 RX ORDER — TADALAFIL 20 MG/1
20 TABLET ORAL
Qty: 30 | Refills: 5 | Status: ACTIVE | COMMUNITY
Start: 2024-07-02 | End: 1900-01-01

## 2025-01-21 ENCOUNTER — APPOINTMENT (OUTPATIENT)
Dept: UROLOGY | Facility: CLINIC | Age: 70
End: 2025-01-21
Payer: MEDICARE

## 2025-01-21 ENCOUNTER — NON-APPOINTMENT (OUTPATIENT)
Age: 70
End: 2025-01-21

## 2025-01-21 VITALS
HEART RATE: 77 BPM | BODY MASS INDEX: 27.13 KG/M2 | WEIGHT: 179 LBS | OXYGEN SATURATION: 98 % | TEMPERATURE: 98 F | DIASTOLIC BLOOD PRESSURE: 80 MMHG | SYSTOLIC BLOOD PRESSURE: 124 MMHG | RESPIRATION RATE: 17 BRPM | HEIGHT: 68 IN

## 2025-01-21 DIAGNOSIS — R35.0 FREQUENCY OF MICTURITION: ICD-10-CM

## 2025-01-21 DIAGNOSIS — Z12.5 ENCOUNTER FOR SCREENING FOR MALIGNANT NEOPLASM OF PROSTATE: ICD-10-CM

## 2025-01-21 DIAGNOSIS — C61 MALIGNANT NEOPLASM OF PROSTATE: ICD-10-CM

## 2025-01-21 PROCEDURE — G2211 COMPLEX E/M VISIT ADD ON: CPT

## 2025-01-21 PROCEDURE — 99214 OFFICE O/P EST MOD 30 MIN: CPT

## 2025-01-22 LAB
PSA FREE FLD-MCNC: 14 %
PSA FREE SERPL-MCNC: 0.26 NG/ML
PSA SERPL-MCNC: 1.89 NG/ML

## 2025-01-29 ENCOUNTER — APPOINTMENT (OUTPATIENT)
Dept: MRI IMAGING | Facility: CLINIC | Age: 70
End: 2025-01-29
Payer: MEDICARE

## 2025-01-29 PROCEDURE — 72197 MRI PELVIS W/O & W/DYE: CPT

## 2025-01-29 PROCEDURE — 76498P: CUSTOM

## 2025-01-29 PROCEDURE — A9585: CPT | Mod: JW

## 2025-02-04 ENCOUNTER — NON-APPOINTMENT (OUTPATIENT)
Age: 70
End: 2025-02-04

## 2025-02-05 ENCOUNTER — OUTPATIENT (OUTPATIENT)
Dept: OUTPATIENT SERVICES | Facility: HOSPITAL | Age: 70
LOS: 1 days | End: 2025-02-05
Payer: MEDICARE

## 2025-02-05 DIAGNOSIS — R97.20 ELEVATED PROSTATE SPECIFIC ANTIGEN [PSA]: ICD-10-CM

## 2025-02-05 DIAGNOSIS — Z98.890 OTHER SPECIFIED POSTPROCEDURAL STATES: Chronic | ICD-10-CM

## 2025-02-05 DIAGNOSIS — Z90.79 ACQUIRED ABSENCE OF OTHER GENITAL ORGAN(S): Chronic | ICD-10-CM

## 2025-02-05 PROCEDURE — C8001: CPT

## 2025-02-06 ENCOUNTER — APPOINTMENT (OUTPATIENT)
Dept: UROLOGY | Facility: CLINIC | Age: 70
End: 2025-02-06
Payer: MEDICARE

## 2025-02-06 ENCOUNTER — OUTPATIENT (OUTPATIENT)
Dept: OUTPATIENT SERVICES | Facility: HOSPITAL | Age: 70
LOS: 1 days | End: 2025-02-06
Payer: MEDICARE

## 2025-02-06 VITALS — DIASTOLIC BLOOD PRESSURE: 84 MMHG | RESPIRATION RATE: 18 BRPM | SYSTOLIC BLOOD PRESSURE: 144 MMHG | HEART RATE: 80 BPM

## 2025-02-06 DIAGNOSIS — Z98.890 OTHER SPECIFIED POSTPROCEDURAL STATES: Chronic | ICD-10-CM

## 2025-02-06 DIAGNOSIS — Z90.79 ACQUIRED ABSENCE OF OTHER GENITAL ORGAN(S): Chronic | ICD-10-CM

## 2025-02-06 DIAGNOSIS — R35.0 FREQUENCY OF MICTURITION: ICD-10-CM

## 2025-02-06 PROCEDURE — 76999F: CUSTOM | Mod: 26

## 2025-02-06 PROCEDURE — 55700: CPT

## 2025-02-06 PROCEDURE — 76999 ECHO EXAMINATION PROCEDURE: CPT

## 2025-02-07 ENCOUNTER — NON-APPOINTMENT (OUTPATIENT)
Age: 70
End: 2025-02-07

## 2025-02-07 DIAGNOSIS — C61 MALIGNANT NEOPLASM OF PROSTATE: ICD-10-CM

## 2025-02-10 ENCOUNTER — NON-APPOINTMENT (OUTPATIENT)
Age: 70
End: 2025-02-10

## 2025-02-11 LAB — PROSTATE BIOPSY: NORMAL

## 2025-02-13 ENCOUNTER — APPOINTMENT (OUTPATIENT)
Dept: UROLOGY | Facility: CLINIC | Age: 70
End: 2025-02-13
Payer: MEDICARE

## 2025-02-13 VITALS
RESPIRATION RATE: 18 BRPM | BODY MASS INDEX: 25.01 KG/M2 | OXYGEN SATURATION: 98 % | WEIGHT: 165 LBS | SYSTOLIC BLOOD PRESSURE: 124 MMHG | DIASTOLIC BLOOD PRESSURE: 69 MMHG | HEIGHT: 68 IN | HEART RATE: 80 BPM

## 2025-02-13 DIAGNOSIS — C61 MALIGNANT NEOPLASM OF PROSTATE: ICD-10-CM

## 2025-02-13 PROCEDURE — 99214 OFFICE O/P EST MOD 30 MIN: CPT

## 2025-02-13 PROCEDURE — G2211 COMPLEX E/M VISIT ADD ON: CPT

## 2025-02-21 ENCOUNTER — OUTPATIENT (OUTPATIENT)
Dept: OUTPATIENT SERVICES | Facility: HOSPITAL | Age: 70
LOS: 1 days | End: 2025-02-21

## 2025-02-21 VITALS
HEIGHT: 68 IN | TEMPERATURE: 98 F | HEART RATE: 66 BPM | SYSTOLIC BLOOD PRESSURE: 106 MMHG | OXYGEN SATURATION: 98 % | DIASTOLIC BLOOD PRESSURE: 73 MMHG | RESPIRATION RATE: 17 BRPM | WEIGHT: 169.98 LBS

## 2025-02-21 DIAGNOSIS — C61 MALIGNANT NEOPLASM OF PROSTATE: ICD-10-CM

## 2025-02-21 DIAGNOSIS — Z98.890 OTHER SPECIFIED POSTPROCEDURAL STATES: Chronic | ICD-10-CM

## 2025-02-21 DIAGNOSIS — Z90.79 ACQUIRED ABSENCE OF OTHER GENITAL ORGAN(S): Chronic | ICD-10-CM

## 2025-02-21 NOTE — H&P PST ADULT - NSICDXPASTSURGICALHX_GEN_ALL_CORE_FT
PAST SURGICAL HISTORY:  History of prostate surgery 2018 "RESUME procedure"    S/P TURP (transurethral resection of prostate) 2021    Status post cryoablation

## 2025-02-21 NOTE — H&P PST ADULT - PROBLEM SELECTOR PLAN 1
Patient tentatively scheduled for Transperineal fusion guide focal cryoablation on 3/3/25.  Pre-op instructions provided. Pt given verbal and written instructions.  Patient instructed to take Omeprazole with a sip of water on the morning of procedure.   Pt instructed to hold prophylactic ASA 7 days before procedure.     Urine culture was done at [facility].

## 2025-02-21 NOTE — H&P PST ADULT - NSWEIGHTCALCTOOLDRUG_GEN_A_CORE
What Brings You In Today? (This Is An Xx Year Old Patient Who Presents For...): Dark patches on the face
What Previous Treatments Have You Tried? (The Patient Has Tried The Following Treatments...): No treatment has been applied.
 used

## 2025-02-21 NOTE — H&P PST ADULT - NSANTHOSAYNRD_GEN_A_CORE
No. AUTUMN screening performed.  STOP BANG Legend: 0-2 = LOW Risk; 3-4 = INTERMEDIATE Risk; 5-8 = HIGH Risk Adequate: hears normal conversation without difficulty

## 2025-02-21 NOTE — H&P PST ADULT - NEGATIVE ENMT SYMPTOMS
no ear pain/no tinnitus/no vertigo/no sinus symptoms/no nasal congestion/no nasal discharge/no nasal obstruction/no post-nasal discharge/no dry mouth/no throat pain/no dysphagia

## 2025-02-21 NOTE — H&P PST ADULT - MUSCULOSKELETAL
details… normal/ROM intact/no joint erythema/no joint warmth/no calf tenderness/normal gait/strength 5/5 bilateral upper extremities/strength 5/5 bilateral lower extremities

## 2025-02-21 NOTE — H&P PST ADULT - HISTORY OF PRESENT ILLNESS
69 year old male with pmhx of HTN, HLD, GERD, BPH, Prostate cancer s/p cryoablation (2022), presents for pre-op evaluation for diagnosis of Malignant neoplasm of prostate. Pt has history of right prostate lesion which was under surveillance since 2024. Patient had a repeat MRI 1/25 and lesion has increased in size. S/p biopsy which showed Adenocarcinoma of prostate ([place] score 3+3=6). Pt refused further surveillance and opted for cryoablation. Pt has a hx of chronic urinary hesitancy, increased urinary frequency, nocturia, occasional urinary "dribbling". Denies hematuria, dysuria, fevers, cp, sob, yadav, dizziness, n/v/d/c.

## 2025-02-21 NOTE — H&P PST ADULT - NEUROLOGICAL
normal/cranial nerves II-XII intact/sensation intact/responds to pain/no spontaneous movement details…

## 2025-02-22 LAB
CULTURE RESULTS: SIGNIFICANT CHANGE UP
SPECIMEN SOURCE: SIGNIFICANT CHANGE UP

## 2025-02-26 ENCOUNTER — NON-APPOINTMENT (OUTPATIENT)
Age: 70
End: 2025-02-26

## 2025-03-03 ENCOUNTER — TRANSCRIPTION ENCOUNTER (OUTPATIENT)
Age: 70
End: 2025-03-03

## 2025-03-03 ENCOUNTER — APPOINTMENT (OUTPATIENT)
Dept: UROLOGY | Facility: AMBULATORY SURGERY CENTER | Age: 70
End: 2025-03-03

## 2025-03-03 ENCOUNTER — OUTPATIENT (OUTPATIENT)
Dept: OUTPATIENT SERVICES | Facility: HOSPITAL | Age: 70
LOS: 1 days | Discharge: HOME | End: 2025-03-03
Payer: MEDICARE

## 2025-03-03 VITALS
SYSTOLIC BLOOD PRESSURE: 109 MMHG | HEART RATE: 66 BPM | TEMPERATURE: 98 F | DIASTOLIC BLOOD PRESSURE: 72 MMHG | RESPIRATION RATE: 14 BRPM | OXYGEN SATURATION: 100 %

## 2025-03-03 VITALS
SYSTOLIC BLOOD PRESSURE: 114 MMHG | HEART RATE: 78 BPM | WEIGHT: 169.98 LBS | OXYGEN SATURATION: 98 % | HEIGHT: 68 IN | TEMPERATURE: 97 F | RESPIRATION RATE: 17 BRPM | DIASTOLIC BLOOD PRESSURE: 75 MMHG

## 2025-03-03 DIAGNOSIS — Z90.79 ACQUIRED ABSENCE OF OTHER GENITAL ORGAN(S): Chronic | ICD-10-CM

## 2025-03-03 DIAGNOSIS — Z98.890 OTHER SPECIFIED POSTPROCEDURAL STATES: Chronic | ICD-10-CM

## 2025-03-03 DIAGNOSIS — C61 MALIGNANT NEOPLASM OF PROSTATE: ICD-10-CM

## 2025-03-03 PROCEDURE — 55873 CRYOABLATE PROSTATE: CPT

## 2025-03-03 PROCEDURE — 76999F: CUSTOM | Mod: 26

## 2025-03-03 DEVICE — GUIDEWIRE AMPLATZ SUPER-STIFF .038" X 145CM 3.5CM FLEXIBLE: Type: IMPLANTABLE DEVICE | Status: FUNCTIONAL

## 2025-03-03 DEVICE — KIT VL PROSTATE 3 ICEPEARL 2.1 CX - FOR LIJ ONLY: Type: IMPLANTABLE DEVICE | Status: FUNCTIONAL

## 2025-03-03 RX ORDER — ALFUZOSIN HYDROCHLORIDE 10 MG/1
1 TABLET, EXTENDED RELEASE ORAL
Refills: 0 | DISCHARGE

## 2025-03-03 RX ORDER — CIPROFLOXACIN HCL 250 MG
1 TABLET ORAL
Qty: 6 | Refills: 0
Start: 2025-03-03 | End: 2025-03-05

## 2025-03-03 NOTE — ASU PREOPERATIVE ASSESSMENT, ADULT ([facility] ONLY) - FALL HARM RISK - UNIVERSAL INTERVENTIONS
Bed in lowest position, wheels locked, appropriate side rails in place/Call bell, personal items and telephone in reach/Instruct patient to call for assistance before getting out of bed or chair/Non-slip footwear when patient is out of bed/[place]et to call system/Physically safe environment - no spills, clutter or unnecessary equipment/Purposeful Proactive Rounding/Room/bathroom lighting operational, light cord in reach

## 2025-03-03 NOTE — ASU DISCHARGE PLAN (ADULT/PEDIATRIC) - ASU DC SPECIAL INSTRUCTIONSFT
CATHETER: You will have a catheter, the nurses will review instructions and care before you go home. You will remove your catheter yourself tomorrow morning, at home. It is not uncommon to see blood in the urine immediately after the procedure.   PAIN CONTROL: You may take 650 mg of Tylenol every 4-6 hours. Do not exceed 4 grams of Tylenol daily.   WOUND Care: expect to see blood in the stool or urine for a few days after the procedure.  ANTIBIOTICS: A prescription for the antibiotic ciprofloxacin has been sent to your pharmacy, please take as instructed.   BATHING: resume showering as usual  ACTIVITY: No heavy lifting or straining. Otherwise, you may return to your usual level of physical activity.  DIET: Return to your usual diet.  NOTIFY YOUR SURGEON IF: You have any bleeding that does not stop, any pus draining from your wound, any fever (over 100.4 F) or chills, persistent nausea/vomiting, persistent diarrhea, or if your pain is not controlled on your discharge pain medications.  FOLLOW-UP:  1. Please call your surgeon to make a follow-up appointment within 2 weeks  2. Please follow up with your primary care physician in 1-2 weeks

## 2025-03-03 NOTE — ASU DISCHARGE PLAN (ADULT/PEDIATRIC) - FINANCIAL ASSISTANCE
[facility] provides services at a reduced cost to those who are determined to be eligible through [facility]’s financial assistance program. Information regarding [facility]’s financial assistance program can be found by going to [email or web address] or by calling [phone].

## 2025-03-03 NOTE — ASU PREOP CHECKLIST - AS BP NONINV METHOD
electronic Comment: Pretreat with HQ prior to micro needling Detail Level: Simple Render Risk Assessment In Note?: no

## 2025-03-03 NOTE — ASU DISCHARGE PLAN (ADULT/PEDIATRIC) - CARE PROVIDER_API CALL
Gaston Ghosh)  Urology  [address] [place] [address], [address] [address]  Phone: [phone]  Fax: [phone]  Follow Up Time:

## 2025-03-04 ENCOUNTER — APPOINTMENT (OUTPATIENT)
Dept: UROLOGY | Facility: CLINIC | Age: 70
End: 2025-03-04

## 2025-03-04 ENCOUNTER — NON-APPOINTMENT (OUTPATIENT)
Age: 70
End: 2025-03-04

## 2025-04-24 ENCOUNTER — APPOINTMENT (OUTPATIENT)
Dept: UROLOGY | Facility: CLINIC | Age: 70
End: 2025-04-24
Payer: MEDICARE

## 2025-04-24 VITALS
BODY MASS INDEX: 26.37 KG/M2 | HEIGHT: 68 IN | HEART RATE: 73 BPM | OXYGEN SATURATION: 98 % | DIASTOLIC BLOOD PRESSURE: 76 MMHG | WEIGHT: 174 LBS | SYSTOLIC BLOOD PRESSURE: 116 MMHG

## 2025-04-24 DIAGNOSIS — N13.8 BENIGN PROSTATIC HYPERPLASIA WITH LOWER URINARY TRACT SYMPMS: ICD-10-CM

## 2025-04-24 DIAGNOSIS — C61 MALIGNANT NEOPLASM OF PROSTATE: ICD-10-CM

## 2025-04-24 DIAGNOSIS — N40.1 BENIGN PROSTATIC HYPERPLASIA WITH LOWER URINARY TRACT SYMPMS: ICD-10-CM

## 2025-04-24 LAB
PSA FREE FLD-MCNC: 24 %
PSA FREE SERPL-MCNC: 0.18 NG/ML
PSA SERPL-MCNC: 0.73 NG/ML

## 2025-04-24 PROCEDURE — 99024 POSTOP FOLLOW-UP VISIT: CPT

## 2025-04-28 RX ORDER — TADALAFIL 5 MG/1
5 TABLET ORAL
Qty: 90 | Refills: 3 | Status: ACTIVE | COMMUNITY
Start: 2025-04-24

## (undated) DEVICE — NDL MAX CORE 18G X 25CM

## (undated) DEVICE — VENODYNE/SCD SLEEVE CALF LARGE

## (undated) DEVICE — DRAINAGE BAG URINARY 2L

## (undated) DEVICE — NEOGUARD ENDOCAVITY PROBE COVER 1 X 11.8"

## (undated) DEVICE — TUBING IV EXTENSION MICROBORE W MICROCLAVE 7"

## (undated) DEVICE — Device

## (undated) DEVICE — GLV 8 PROTEXIS (WHITE)

## (undated) DEVICE — FOLEY HOLDER STATLOCK 2 WAY ADULT

## (undated) DEVICE — DRSG TELFA 3 X 8

## (undated) DEVICE — BASIN SET DOUBLE

## (undated) DEVICE — SUT ETHILON 2-0 30" KS

## (undated) DEVICE — GOWN XXXL

## (undated) DEVICE — DRAPE C ARM BAND BAG

## (undated) DEVICE — BALLOON ENDOCAVITY 2X14CM

## (undated) DEVICE — SYR LUER LOK 20CC

## (undated) DEVICE — SYR LUER LOK 10CC

## (undated) DEVICE — TUBING TUR 2 PRONG

## (undated) DEVICE — POSITIONER FOAM EGG CRATE ULNAR 2PCS (PINK)

## (undated) DEVICE — SYR TOOMEY 50ML

## (undated) DEVICE — WARMING BLANKET UPPER ADULT

## (undated) DEVICE — GRID BRACHYTHERAPY EZ 18G

## (undated) DEVICE — SYR LUER LOK 50CC

## (undated) DEVICE — GRID BRACHYTHERAPY EZ 14G

## (undated) DEVICE — FOLEY CATH 2-WAY 16FR 5CC LATEX LUBRICATH